# Patient Record
Sex: FEMALE | Race: WHITE | Employment: FULL TIME | URBAN - METROPOLITAN AREA
[De-identification: names, ages, dates, MRNs, and addresses within clinical notes are randomized per-mention and may not be internally consistent; named-entity substitution may affect disease eponyms.]

---

## 2020-03-20 LAB
CHLAMYDIA, EXTERNAL: NEGATIVE
N. GONORRHEA, EXTERNAL: NEGATIVE

## 2020-04-08 LAB
ANTIBODY SCREEN, EXTERNAL: NEGATIVE
HBSAG, EXTERNAL: NEGATIVE
HIV, EXTERNAL: NEGATIVE
RPR, EXTERNAL: NORMAL
RUBELLA, EXTERNAL: NORMAL

## 2020-09-25 LAB — GRBS, EXTERNAL: NEGATIVE

## 2020-10-07 ENCOUNTER — HOSPITAL ENCOUNTER (OUTPATIENT)
Dept: LAB | Age: 35
Discharge: HOME | End: 2020-10-07
Payer: COMMERCIAL

## 2020-10-07 ENCOUNTER — TRANSCRIBE ORDER (OUTPATIENT)
Dept: REGISTRATION | Age: 35
End: 2020-10-07

## 2020-10-07 DIAGNOSIS — Z01.812 PRE-PROCEDURAL LABORATORY EXAMINATIONS: Primary | ICD-10-CM

## 2020-10-07 DIAGNOSIS — Z01.812 PRE-PROCEDURAL LABORATORY EXAMINATIONS: ICD-10-CM

## 2020-10-07 PROCEDURE — 87635 SARS-COV-2 COVID-19 AMP PRB: CPT

## 2020-10-08 LAB — SARS-COV-2, COV2NT: NOT DETECTED

## 2020-10-13 ENCOUNTER — ANESTHESIA EVENT (OUTPATIENT)
Dept: LABOR AND DELIVERY | Age: 35
End: 2020-10-13
Payer: COMMERCIAL

## 2020-10-13 ENCOUNTER — HOSPITAL ENCOUNTER (INPATIENT)
Age: 35
LOS: 2 days | Discharge: HOME | End: 2020-10-15
Attending: OBSTETRICS & GYNECOLOGY | Admitting: OBSTETRICS & GYNECOLOGY
Payer: COMMERCIAL

## 2020-10-13 ENCOUNTER — ANESTHESIA (OUTPATIENT)
Dept: LABOR AND DELIVERY | Age: 35
End: 2020-10-13
Payer: COMMERCIAL

## 2020-10-13 LAB
DAILY QC (YES/NO)?: YES
ERYTHROCYTE [DISTWIDTH] IN BLOOD BY AUTOMATED COUNT: 13 % (ref 11.5–14.5)
HCT VFR BLD AUTO: 38.4 % (ref 35–47)
HGB BLD-MCNC: 13.2 G/DL (ref 11.5–16)
MCH RBC QN AUTO: 30.9 PG (ref 26–34)
MCHC RBC AUTO-ENTMCNC: 34.4 G/DL (ref 30–36.5)
MCV RBC AUTO: 89.9 FL (ref 80–99)
NRBC # BLD: 0 K/UL (ref 0–0.01)
NRBC BLD-RTO: 0 PER 100 WBC
PH, VAGINAL FLUID: 6 (ref 5–6.1)
PLATELET # BLD AUTO: 163 K/UL (ref 150–400)
PMV BLD AUTO: 11 FL (ref 8.9–12.9)
RBC # BLD AUTO: 4.27 M/UL (ref 3.8–5.2)
WBC # BLD AUTO: 11.9 K/UL (ref 3.6–11)

## 2020-10-13 PROCEDURE — 75810000275 HC EMERGENCY DEPT VISIT NO LEVEL OF CARE

## 2020-10-13 PROCEDURE — 76060000078 HC EPIDURAL ANESTHESIA: Performed by: ANESTHESIOLOGY

## 2020-10-13 PROCEDURE — 75410000002 HC LABOR FEE PER 1 HR: Performed by: OBSTETRICS & GYNECOLOGY

## 2020-10-13 PROCEDURE — 36415 COLL VENOUS BLD VENIPUNCTURE: CPT

## 2020-10-13 PROCEDURE — 3E0R3BZ INTRODUCTION OF ANESTHETIC AGENT INTO SPINAL CANAL, PERCUTANEOUS APPROACH: ICD-10-PCS | Performed by: ANESTHESIOLOGY

## 2020-10-13 PROCEDURE — 74011000250 HC RX REV CODE- 250: Performed by: ANESTHESIOLOGY

## 2020-10-13 PROCEDURE — 2709999900 HC NON-CHARGEABLE SUPPLY

## 2020-10-13 PROCEDURE — 74011250636 HC RX REV CODE- 250/636: Performed by: ANESTHESIOLOGY

## 2020-10-13 PROCEDURE — 85027 COMPLETE CBC AUTOMATED: CPT

## 2020-10-13 PROCEDURE — 74011250636 HC RX REV CODE- 250/636: Performed by: OBSTETRICS & GYNECOLOGY

## 2020-10-13 PROCEDURE — 74011250637 HC RX REV CODE- 250/637: Performed by: OBSTETRICS & GYNECOLOGY

## 2020-10-13 PROCEDURE — 00HU33Z INSERTION OF INFUSION DEVICE INTO SPINAL CANAL, PERCUTANEOUS APPROACH: ICD-10-PCS | Performed by: ANESTHESIOLOGY

## 2020-10-13 PROCEDURE — 99283 EMERGENCY DEPT VISIT LOW MDM: CPT

## 2020-10-13 PROCEDURE — 59025 FETAL NON-STRESS TEST: CPT

## 2020-10-13 PROCEDURE — 77030005513 HC CATH URETH FOL11 MDII -B

## 2020-10-13 PROCEDURE — 77030014125 HC TY EPDRL BBMI -B: Performed by: ANESTHESIOLOGY

## 2020-10-13 PROCEDURE — 83986 ASSAY PH BODY FLUID NOS: CPT | Performed by: OBSTETRICS & GYNECOLOGY

## 2020-10-13 PROCEDURE — 74011250637 HC RX REV CODE- 250/637

## 2020-10-13 PROCEDURE — 75410000003 HC RECOV DEL/VAG/CSECN EA 0.5 HR: Performed by: OBSTETRICS & GYNECOLOGY

## 2020-10-13 PROCEDURE — 65270000029 HC RM PRIVATE

## 2020-10-13 PROCEDURE — 75410000000 HC DELIVERY VAGINAL/SINGLE: Performed by: OBSTETRICS & GYNECOLOGY

## 2020-10-13 RX ORDER — METHYLERGONOVINE MALEATE 0.2 MG/ML
0.2 INJECTION INTRAVENOUS ONCE
Status: COMPLETED | OUTPATIENT
Start: 2020-10-13 | End: 2020-10-13

## 2020-10-13 RX ORDER — ZOLPIDEM TARTRATE 5 MG/1
5 TABLET ORAL
Status: DISCONTINUED | OUTPATIENT
Start: 2020-10-13 | End: 2020-10-15 | Stop reason: HOSPADM

## 2020-10-13 RX ORDER — OXYTOCIN/RINGER'S LACTATE 30/500 ML
999 PLASTIC BAG, INJECTION (ML) INTRAVENOUS ONCE
Status: COMPLETED | OUTPATIENT
Start: 2020-10-13 | End: 2020-10-13

## 2020-10-13 RX ORDER — NALOXONE HYDROCHLORIDE 0.4 MG/ML
0.4 INJECTION, SOLUTION INTRAMUSCULAR; INTRAVENOUS; SUBCUTANEOUS AS NEEDED
Status: DISCONTINUED | OUTPATIENT
Start: 2020-10-13 | End: 2020-10-15 | Stop reason: HOSPADM

## 2020-10-13 RX ORDER — OXYTOCIN/RINGER'S LACTATE 30/500 ML
10 PLASTIC BAG, INJECTION (ML) INTRAVENOUS AS NEEDED
Status: DISCONTINUED | OUTPATIENT
Start: 2020-10-13 | End: 2020-10-15 | Stop reason: HOSPADM

## 2020-10-13 RX ORDER — SODIUM CHLORIDE 0.9 % (FLUSH) 0.9 %
5-40 SYRINGE (ML) INJECTION EVERY 8 HOURS
Status: DISCONTINUED | OUTPATIENT
Start: 2020-10-13 | End: 2020-10-14

## 2020-10-13 RX ORDER — IBUPROFEN 800 MG/1
800 TABLET ORAL
Status: DISCONTINUED | OUTPATIENT
Start: 2020-10-13 | End: 2020-10-15 | Stop reason: HOSPADM

## 2020-10-13 RX ORDER — SODIUM CHLORIDE, SODIUM LACTATE, POTASSIUM CHLORIDE, CALCIUM CHLORIDE 600; 310; 30; 20 MG/100ML; MG/100ML; MG/100ML; MG/100ML
125 INJECTION, SOLUTION INTRAVENOUS CONTINUOUS
Status: DISCONTINUED | OUTPATIENT
Start: 2020-10-13 | End: 2020-10-15

## 2020-10-13 RX ORDER — CALCIUM CARBONATE 200(500)MG
2 TABLET,CHEWABLE ORAL DAILY
COMMUNITY

## 2020-10-13 RX ORDER — ONDANSETRON 2 MG/ML
4 INJECTION INTRAMUSCULAR; INTRAVENOUS
Status: DISCONTINUED | OUTPATIENT
Start: 2020-10-13 | End: 2020-10-15 | Stop reason: HOSPADM

## 2020-10-13 RX ORDER — OXYTOCIN/RINGER'S LACTATE 30/500 ML
750 PLASTIC BAG, INJECTION (ML) INTRAVENOUS ONCE
Status: COMPLETED | OUTPATIENT
Start: 2020-10-13 | End: 2020-10-13

## 2020-10-13 RX ORDER — SODIUM CHLORIDE 0.9 % (FLUSH) 0.9 %
5-40 SYRINGE (ML) INJECTION AS NEEDED
Status: DISCONTINUED | OUTPATIENT
Start: 2020-10-13 | End: 2020-10-15

## 2020-10-13 RX ORDER — HYDROCODONE BITARTRATE AND ACETAMINOPHEN 5; 325 MG/1; MG/1
1 TABLET ORAL
Status: DISCONTINUED | OUTPATIENT
Start: 2020-10-13 | End: 2020-10-15 | Stop reason: HOSPADM

## 2020-10-13 RX ORDER — EPHEDRINE SULFATE/0.9% NACL/PF 50 MG/5 ML
10 SYRINGE (ML) INTRAVENOUS
Status: DISCONTINUED | OUTPATIENT
Start: 2020-10-13 | End: 2020-10-15 | Stop reason: HOSPADM

## 2020-10-13 RX ORDER — OXYTOCIN/RINGER'S LACTATE 30/500 ML
95 PLASTIC BAG, INJECTION (ML) INTRAVENOUS AS NEEDED
Status: COMPLETED | OUTPATIENT
Start: 2020-10-13 | End: 2020-10-13

## 2020-10-13 RX ORDER — OXYTOCIN/RINGER'S LACTATE 30/500 ML
95 PLASTIC BAG, INJECTION (ML) INTRAVENOUS AS NEEDED
Status: DISCONTINUED | OUTPATIENT
Start: 2020-10-13 | End: 2020-10-15 | Stop reason: HOSPADM

## 2020-10-13 RX ORDER — MINERAL OIL
OIL (ML) ORAL ONCE
Status: COMPLETED | OUTPATIENT
Start: 2020-10-13 | End: 2020-10-13

## 2020-10-13 RX ORDER — FAMOTIDINE 20 MG/1
20 TABLET, FILM COATED ORAL 2 TIMES DAILY
COMMUNITY

## 2020-10-13 RX ORDER — DIPHENHYDRAMINE HCL 25 MG
25 CAPSULE ORAL
Status: DISCONTINUED | OUTPATIENT
Start: 2020-10-13 | End: 2020-10-15 | Stop reason: HOSPADM

## 2020-10-13 RX ORDER — FENTANYL/BUPIVACAINE/NS/PF 2-1250MCG
1-16 PREFILLED PUMP RESERVOIR EPIDURAL CONTINUOUS
Status: DISCONTINUED | OUTPATIENT
Start: 2020-10-13 | End: 2020-10-15

## 2020-10-13 RX ORDER — ACETAMINOPHEN 325 MG/1
650 TABLET ORAL
Status: DISCONTINUED | OUTPATIENT
Start: 2020-10-13 | End: 2020-10-15 | Stop reason: HOSPADM

## 2020-10-13 RX ORDER — NALOXONE HYDROCHLORIDE 0.4 MG/ML
0.4 INJECTION, SOLUTION INTRAMUSCULAR; INTRAVENOUS; SUBCUTANEOUS AS NEEDED
Status: DISCONTINUED | OUTPATIENT
Start: 2020-10-13 | End: 2020-10-13 | Stop reason: SDUPTHER

## 2020-10-13 RX ORDER — HYDROCORTISONE ACETATE PRAMOXINE HCL 2.5; 1 G/100G; G/100G
CREAM TOPICAL AS NEEDED
Status: DISCONTINUED | OUTPATIENT
Start: 2020-10-13 | End: 2020-10-15 | Stop reason: HOSPADM

## 2020-10-13 RX ORDER — HYDROCODONE BITARTRATE AND ACETAMINOPHEN 5; 325 MG/1; MG/1
2 TABLET ORAL
Status: DISCONTINUED | OUTPATIENT
Start: 2020-10-13 | End: 2020-10-15 | Stop reason: HOSPADM

## 2020-10-13 RX ORDER — LIDOCAINE HYDROCHLORIDE 10 MG/ML
10 INJECTION INFILTRATION; PERINEURAL AS NEEDED
Status: DISCONTINUED | OUTPATIENT
Start: 2020-10-13 | End: 2020-10-15 | Stop reason: HOSPADM

## 2020-10-13 RX ORDER — BUPIVACAINE HYDROCHLORIDE 2.5 MG/ML
INJECTION, SOLUTION EPIDURAL; INFILTRATION; INTRACAUDAL AS NEEDED
Status: DISCONTINUED | OUTPATIENT
Start: 2020-10-13 | End: 2020-10-13 | Stop reason: HOSPADM

## 2020-10-13 RX ORDER — ONDANSETRON 4 MG/1
4 TABLET, ORALLY DISINTEGRATING ORAL
Status: ACTIVE | OUTPATIENT
Start: 2020-10-13 | End: 2020-10-14

## 2020-10-13 RX ORDER — MINERAL OIL
OIL (ML) ORAL
Status: COMPLETED
Start: 2020-10-13 | End: 2020-10-13

## 2020-10-13 RX ADMIN — SODIUM CHLORIDE, SODIUM LACTATE, POTASSIUM CHLORIDE, AND CALCIUM CHLORIDE 1000 ML: 600; 310; 30; 20 INJECTION, SOLUTION INTRAVENOUS at 05:20

## 2020-10-13 RX ADMIN — OXYTOCIN 45000 MILLI-UNITS/HR: 10 INJECTION INTRAVENOUS at 08:42

## 2020-10-13 RX ADMIN — Medication 10 ML/HR: at 06:12

## 2020-10-13 RX ADMIN — OXYTOCIN 95 MILLI-UNITS/MIN: 10 INJECTION INTRAVENOUS at 08:54

## 2020-10-13 RX ADMIN — Medication 30 ML: at 08:29

## 2020-10-13 RX ADMIN — OXYTOCIN 59940 MILLI-UNITS/HR: 10 INJECTION INTRAVENOUS at 08:32

## 2020-10-13 RX ADMIN — IBUPROFEN 800 MG: 800 TABLET ORAL at 18:50

## 2020-10-13 RX ADMIN — METHYLERGONOVINE MALEATE 0.2 MG: 0.2 INJECTION, SOLUTION INTRAMUSCULAR; INTRAVENOUS at 10:26

## 2020-10-13 RX ADMIN — MINERAL OIL 30 ML: 1000 SOLUTION ORAL at 08:29

## 2020-10-13 RX ADMIN — OXYTOCIN 150 MILLI-UNITS/MIN: 10 INJECTION INTRAVENOUS at 09:39

## 2020-10-13 RX ADMIN — SODIUM CHLORIDE, SODIUM LACTATE, POTASSIUM CHLORIDE, AND CALCIUM CHLORIDE 999 ML/HR: 600; 310; 30; 20 INJECTION, SOLUTION INTRAVENOUS at 05:13

## 2020-10-13 RX ADMIN — SODIUM CHLORIDE, SODIUM LACTATE, POTASSIUM CHLORIDE, AND CALCIUM CHLORIDE 1000 ML: 600; 310; 30; 20 INJECTION, SOLUTION INTRAVENOUS at 06:20

## 2020-10-13 RX ADMIN — BUPIVACAINE HYDROCHLORIDE 10 ML: 2.5 INJECTION, SOLUTION EPIDURAL; INFILTRATION; INTRACAUDAL; PERINEURAL at 05:55

## 2020-10-13 RX ADMIN — SODIUM CHLORIDE, SODIUM LACTATE, POTASSIUM CHLORIDE, AND CALCIUM CHLORIDE 125 ML/HR: 600; 310; 30; 20 INJECTION, SOLUTION INTRAVENOUS at 06:59

## 2020-10-13 RX ADMIN — IBUPROFEN 800 MG: 800 TABLET ORAL at 11:17

## 2020-10-13 NOTE — ANESTHESIA PROCEDURE NOTES
Epidural Block    Start time: 10/13/2020 5:47 AM  End time: 10/13/2020 5:55 AM  Performed by: Yvonne Quinn MD  Authorized by: Yvonne Quinn MD     Pre-Procedure  Indication: labor epidural    Preanesthetic Checklist: patient identified, risks and benefits discussed, anesthesia consent, timeout performed and anesthesia consent    Timeout Time: 05:47        Epidural:   Patient position:  Seated  Prep region:  Lumbar  Prep: Betadine    Location:  L3-4    Needle and Epidural Catheter:   Needle Type:  Tuohy  Needle Gauge:  17 G  Injection Technique:  Loss of resistance using air  Attempts:  1  Catheter Size:  18 G  Catheter at Skin Depth (cm):  8  Depth in Epidural Space (cm):  4  Events: no paresthesia and negative aspiration test    Test Dose:  Lidocaine 1.5% w/ epi and negative    Assessment:   Catheter Secured:  Tegaderm and tape  Insertion:  Uncomplicated  Patient tolerance:  Patient tolerated the procedure well with no immediate complications

## 2020-10-13 NOTE — ROUTINE PROCESS
SBAR IN Report: Mother Verbal report received from Teresa Tovar RN (full name & credentials) on this patient, who is now being transferred from L&D (unit) for routine progression of care. The patient is not wearing a green \"Anesthesia-Duramorph\" band. Report consisted of patient's Situation, Background, Assessment and Recommendations (SBAR). [place] ID bands were compared with the identification form, and verified with the patient and transferring nurse. Information from the SBAR, Kardex, Procedure Summary, Intake/Output and Recent Results and the [place] Report was reviewed with the transferring nurse; opportunity for questions and clarification provided.

## 2020-10-13 NOTE — PROGRESS NOTES
4:50 AM  Patient admitted to labor and delivery with a complaint of contractions. 5:03 AM  The patient stated she thought her water had broken. Nitrazine performed and was equivocal.  5:08 AM  Cervical exam found to be 6cm dilated. 5:37 AM  Spoke to Dr. Paresh Pearson and informed her of the patients arrival and complaint.  Per Dr. Paresh Pearson, call her and notify her once the patient has an epidural.

## 2020-10-13 NOTE — ANESTHESIA PREPROCEDURE EVALUATION
Anesthetic History   No history of anesthetic complications            Review of Systems / Medical History  Patient summary reviewed, nursing notes reviewed and pertinent labs reviewed    Pulmonary  Within defined limits                 Neuro/Psych   Within defined limits           Cardiovascular  Within defined limits                     GI/Hepatic/Renal               Comments: cholestasis Endo/Other        Obesity     Other Findings              Physical Exam    Airway  Mallampati: II  TM Distance: 4 - 6 cm  Neck ROM: normal range of motion        Cardiovascular               Dental  No notable dental hx       Pulmonary                 Abdominal         Other Findings            Anesthetic Plan    ASA: 2  Anesthesia type: epidural            Anesthetic plan and risks discussed with: Patient          note entered after procedure

## 2020-10-13 NOTE — DISCHARGE SUMMARY
Obstetrical Discharge Summary     Name: Alonzo Vaughn MRN: [id number]  SSN: [id number]    YOB: 1985  Age: 30 y.o. Sex: female      Allergies: Patient has no known allergies. Admit Date: 10/13/2020    Discharge Date: 10/15/2020    Admitting Physician: Boyd Paz MD     Attending Physician:  Nelson Toledo MD     * Admission Diagnoses: Labor without complication [W16]    * Discharge Diagnoses:   Information for the patient's :  David Cherry Log Male Maggie Adkins [[id number]]   Delivery of a   male infant via Vaginal, Spontaneous on 10/13/2020 at 8:31 AM  by Antony Rodrigues. Apgars were 9  and 9 . Additional Diagnoses:   Hospital Problems as of 10/13/2020 Never Reviewed          Codes Class Noted - Resolved POA    Labor without complication LWV-[id number]: T41  ICD-9-CM: 316  10/13/2020 - Present Unknown             Lab Results   Component Value Date/Time    Rubella, External Immune 2020    GrBStrep, External Negative 2020    ABO,Rh O Positive 2016      Immunization History   Administered Date(s) Administered    Tdap 2016       * Procedures: Term  by Dr Carlee Benitez  * No surgery found *           * Discharge Condition: good    * Hospital Course: Normal hospital course following the delivery. * Disposition: Home    Discharge Medications:   Current Discharge Medication List          * Follow-up Care/Patient Instructions:   Activity: No sex for 6 weeks and No heavy lifting for 6 weeks  Diet: Regular Diet  Wound Care: None needed    RTO in 6 weeks for pp check    Follow-up Information    None

## 2020-10-13 NOTE — PROGRESS NOTES
4739: Dr. Carrie Blancas at bedside for epidural placement    3818: Time out    0615: SVE by RN, 8/90/-1.    8638: Bedside and Verbal shift change report given to [address] [place] [address] (oncoming nurse) by Jamal Laird RN (offgoing nurse). Report included the following information SBAR, Kardex, Intake/Output, MAR and Recent Results. 8184: Dr. Valente Pyle at bedside. 8190: SVE by MD anterior lip.

## 2020-10-13 NOTE — PROGRESS NOTES
3563: Verbal shift change report given to Audrey Calzada RN (oncoming nurse) by David Pepe RN (offgoing nurse). Report included the following information SBAR, MAR and Recent Results. 5769: This RN at bedside to perform shift assessment. Patient resting in position of comfort in locked and lowered bed. FOB sitting at bedside. Patient reports feeling intermittent pressure. 7063: SVE performed by this RN: 10/100/+1. Patient tolerated well. 6049: Elpidio TRUONG at bedside. VORB to begin pushing, MD to return to bedside. 2543Pfrancis Forbes RN at bedside with this RN.     0800: Patient beginning to push with this RN.      9412: Elpidio TRUONG at bedside assessing patient's pushing. 0831:  of viable, infant boy. Infant immediately placed on maternal abdomen, dried and stimulated. 1038Josiephine Shouts from Elpidio TRUONG to begin PPP titration at 999ml/hr. See MAR.    0840: Gloria care completed by Kris Ho RN. This RN assisting patient with bed pad, chux, and ice pack application. Patient able to lift bilateral hips to assist this RN. Patient repositioned in bed by this RN and spouse. 9948: PPP titration decreased to 750ml/hr. See MAR.    5664: PPP titration decreased to 95ml/hr, per order. 0915: Patient provided with menu. Spouse ordering breakfast for patient. 0935: Bed pad, chux, and ice pack changed. Two hour QBL thus far: 39mL. 1005: Fundus continuously requiring additional massage. Bed pad and chux changed. This RN to inform Elpidio TRUONG.    Additional two hour QBL thus far: 16mL  Total QBL thus far: 355 mL    1007: This RN informing Elpidio TRUONG via telephone of required fundal massage during most fundals, total two hour QBL thus far of 55mL, and patient's baseline BP. This RN asking about possibility of methergine administration. TORB from Elpidio TRUONG to administer 0.2mg Methergine via IM injection. 1033: PPP titration complete. 1115: Gripper socks applied.  Patient ambulatory to restroom with steady gait, accompanied by this RN and Jono Student RN. 1100 clear, bloody urine output. Patient demonstrating understanding of appropriate kristen care using kristen care bottle. Patient assisted into disposable underwear with blue chux and kristen pad. Patient performing hand hygiene. 1145: TRANSFER - OUT REPORT:    Verbal report given to Tressia Claude RN(name) on Ruthie Imus  being transferred to MIU (unit) for routine progression of care       Report consisted of patients Situation, Background, Assessment and   Recommendations(SBAR). Information from the following report(s) SBAR, Intake/Output, MAR and Recent Results was reviewed with the receiving nurse. Lines:   Peripheral IV 10/13/20 Left;Posterior Hand (Active)   Site Assessment Clean, dry, & intact 10/13/20 0900   Phlebitis Assessment 0 10/13/20 0900   Infiltration Assessment 0 10/13/20 0900   Dressing Status Clean, dry, & intact 10/13/20 0900   Dressing Type Transparent 10/13/20 0900   Hub Color/Line Status Pink; Infusing 10/13/20 0900        Opportunity for questions and clarification was provided. Patient transported with:   Registered Nurse Norma Silveira RN. No sign of distress noted at time of care transfer. Patient ambulatory with steady gait from wheelchair to MIU bed.

## 2020-10-13 NOTE — PROCEDURES
Delivery Note    Obstetrician:  Casi Tello MD    Assistant: none    Pre-Delivery Diagnosis: Term pregnancy, Spontaneous labor and Single fetus    Post-Delivery Diagnosis: Living  infant(s) and Male    Intrapartum Event: None    Procedure: Spontaneous vaginal delivery    Epidural: YES    Monitor:  Fetal Heart Tones - External and Uterine Contractions - External    Indications for instrumental delivery: none    Estimated Blood Loss: 300    Episiotomy: none    Laceration(s):  none    Laceration(s) repair: NO    Presentation: Cephalic    Fetal Description: russ    Fetal Position: Occiput Anterior    Birth Weight: pending    Birth Length: pending    Apgar - One Minute: 9    Apgar - Five Minutes: 9    Umbilical Cord: 3 vessels present  Specimens: none  Complications:  none           Cord Blood Results:   Information for the patient's :  Paramjit Maltese, Male Tamar Duty [[id number]]   No results found for: PCTABR, PCTDIG, BILI, ABORH     Prenatal Labs:     Lab Results   Component Value Date/Time    HBsAg, External Negative 2020    HIV, External Negative 2020    Rubella, External Immune 2020    RPR, External Non-Reactive 2020    Gonorrhea, External Negative 2020    Chlamydia, External Negative  2020    GrBStrep, External Negative 2020        Attending Attestation: I was present and scrubbed for the entire procedure

## 2020-10-13 NOTE — H&P
Labor and Delivery Admission Note  10/13/2020    30 y.o., , female, G2 P 1001 Estimated Date of Delivery: 10/20/20 by dates and US presents with contractions at 0448 Pt states that contractions started at home around 0130 and progressively became more severe. She came to L and D for evaluation and Arsalan Yin shortly after admission for clear fluid. She denies VB, and has good FM. No complications with the pregnancy. She denies Fever, N/V/D      PNC: Blood type: O            RH: pos            GBS status: negative    POB:  G1-  at 36 weeks IOL for Cholestasis of pregnancy  G2- current, no complications    Pgyn:  Denies    Past Medical History:   Diagnosis Date    Febrile seizures ([facility] [address]     As a child    Psychiatric problem     anxiety     History reviewed. No pertinent surgical history.   OB/GYN: [facility]    Meds:   Current Facility-Administered Medications   Medication Dose Route Frequency    mineral oil        lactated Ringers infusion  125 mL/hr IntraVENous CONTINUOUS    lactated ringers bolus infusion 500 mL  500 mL IntraVENous PRN    sodium chloride (NS) flush 5-40 mL  5-40 mL IntraVENous Q8H    sodium chloride (NS) flush 5-40 mL  5-40 mL IntraVENous PRN    oxytocin (PITOCIN) 10 unit bolus from bag  10 Units IntraVENous PRN    And    oxytocin (PITOCIN) 30 units/500 ml LR  95 davi-units/min IntraVENous PRN    ondansetron (ZOFRAN) injection 4 mg  4 mg IntraVENous Q4H PRN    naloxone (NARCAN) injection 0.4 mg  0.4 mg IntraVENous PRN    lidocaine (XYLOCAINE) 10 mg/mL (1 %) injection 10 mL  10 mL IntraDERMal PRN    lactated ringers bolus infusion 500 mL  500 mL IntraVENous ONCE    fentaNYL 2mcg/mL - bupivacaine 0.125% pf epidural  1-16 mL/hr Epidural CONTINUOUS    ePHEDrine in NS (PF) (MISTOLE) 10 mg/mL in NS syringe 10 mg  10 mg IntraVENous Q10MIN PRN    mineral oil   Topical ONCE     Facility-Administered Medications Ordered in Other Encounters   Medication Dose Route Frequency    bupivacaine (PF) (MARCAINE) 0.25 % (2.5 mg/mL) injection   Epidural PRN     Allergies: No Known Allergies  Pertinent ROS: see HPI, otherwise noncontributory     History reviewed. No pertinent family history.   Social History     Socioeconomic History    Marital status:      Spouse name: Not on file    Number of children: Not on file    Years of education: Not on file    Highest education level: Not on file   Occupational History    Not on file   Social Needs    Financial resource strain: Not on file    Food insecurity     Worry: Not on file     Inability: Not on file    Transportation needs     Medical: Not on file     Non-medical: Not on file   Tobacco Use    Smoking status: Never Smoker    Smokeless tobacco: Never Used   Substance and Sexual Activity    Alcohol use: Not Currently    Drug use: No    Sexual activity: Never     Partners: Male     Birth control/protection: None   Lifestyle    Physical activity     Days per week: Not on file     Minutes per session: Not on file    Stress: Not on file   Relationships    Social connections     Talks on phone: Not on file     Gets together: Not on file     Attends Orthodox service: Not on file     Active member of club or organization: Not on file     Attends meetings of clubs or organizations: Not on file     Relationship status: Not on file    Intimate partner violence     Fear of current or ex partner: Not on file     Emotionally abused: Not on file     Physically abused: Not on file     Forced sexual activity: Not on file   Other Topics Concern     Service Not Asked    Blood Transfusions Not Asked    Caffeine Concern Not Asked    Occupational Exposure Not Asked   [place] Hazards Not Asked    Sleep Concern Not Asked    Stress Concern Not Asked    Weight Concern Not Asked    Special Diet Not Asked    Back Care Not Asked    Exercise Not Asked    Bike Helmet Not Asked   [address] Not Asked    Self-Exams Not Asked   Social History Narrative    Not on file       OBJECTIVE:  Gravid , female NAD  Temp (24hrs), Av.1 °F (36.7 °C), Min:97.8 °F (36.6 °C), Max:98.4 °F (36.9 °C)    Visit Vitals  /61   Pulse 78   Temp 97.8 °F (36.6 °C)   Ht 5' 8\" (1.727 m)   Wt 97.1 kg (214 lb)   SpO2 100%   Breastfeeding No   BMI 32.54 kg/m²       Labs:    Lab Results   Component Value Date/Time    WBC 11.9 (H) 10/13/2020 05:16 AM       Exam:  HEENT:  normal   Lungs:  clear  Cor:  RRR  Abdomen:  Fundal height c/w GA                    Soft between UC                    Clinical EFW 7 1/2  Fetal heart rate tracin reactive, no decels, + accels, moderate variability, Cat I, reactive  Contraction pattern: Q2-3  Cervix:  8/90/-2 by RN exam  Fluid:  SROM clear fluid  Pelvimetry:  AP-good                      Arch- adequate                      Sidewalls- adequate                      Pelvis feels adequate for fetus.     Impression:  IUP at 39 0/7 weeks ruptured in active labor, GBS +    Plan: Anticipate            Epidural as desired     Ursula Venegas MD

## 2020-10-14 PROCEDURE — 77030021125

## 2020-10-14 PROCEDURE — 65270000029 HC RM PRIVATE

## 2020-10-14 PROCEDURE — 74011250637 HC RX REV CODE- 250/637: Performed by: OBSTETRICS & GYNECOLOGY

## 2020-10-14 RX ORDER — SENNOSIDES 8.6 MG/1
1 TABLET ORAL DAILY
Status: DISCONTINUED | OUTPATIENT
Start: 2020-10-14 | End: 2020-10-15 | Stop reason: HOSPADM

## 2020-10-14 RX ADMIN — IBUPROFEN 800 MG: 800 TABLET ORAL at 23:27

## 2020-10-14 RX ADMIN — IBUPROFEN 800 MG: 800 TABLET ORAL at 06:39

## 2020-10-14 RX ADMIN — IBUPROFEN 800 MG: 800 TABLET ORAL at 15:17

## 2020-10-14 RX ADMIN — SENNA 8.6 MG: 8.6 TABLET, COATED ORAL at 22:10

## 2020-10-14 RX ADMIN — SENNA 8.6 MG: 8.6 TABLET, COATED ORAL at 06:39

## 2020-10-14 NOTE — ROUTINE PROCESS
Bedside and Verbal shift change report given to [facility] Cheryl (oncoming nurse) by Polina Montgomery. Kari Doherty (offgoing nurse). Report given with SBAR, Kardex, Intake/Output and MAR.

## 2020-10-14 NOTE — PROGRESS NOTES
Post-Partum Day Number 1 Progress Note    Melissa Quiroz       Information for the patient's :  Clau Wood, Male Winda Route [[id number]]   Vaginal, Spontaneous    Patient doing well without significant complaint. Voiding without difficulty, normal lochia. Vitals:  Visit Vitals  /60 (BP 1 Location: Left arm, BP Patient Position: At rest)   Pulse 79   Temp 98.2 °F (36.8 °C)   Resp 16   Ht 5' 8\" (1.727 m)   Wt 97.1 kg (214 lb)   SpO2 97%   Breastfeeding Unknown   BMI 32.54 kg/m²     Temp (24hrs), Av °F (36.7 °C), Min:97.7 °F (36.5 °C), Max:98.2 °F (36.8 °C)        Exam:   Patient without distress. FF @ U-2 NT                LE NT w/o edema    Labs:     Lab Results   Component Value Date/Time    WBC 11.9 (H) 10/13/2020 05:16 AM    WBC 10.6 2016 06:34 AM    HGB 13.2 10/13/2020 05:16 AM    HGB 11.4 (L) 2016 06:34 AM    HCT 38.4 10/13/2020 05:16 AM    HCT 34.9 (L) 2016 06:34 AM    PLATELET 560  05:16 AM    PLATELET 356  06:34 AM    Hgb, External 14.0 2016    Hct, External 41.9 2016       No results found for this or any previous visit (from the past 24 hour(s)). Assessment: PPD 1 s/p , stable    Plan:  1. Continue routine postpartum care  2. VMI, pt and  unsure if wanting circ; questions answered but they are still undecided. To alert nursing staff if wanting to have done prior to d/c.   3.  Discharge home tomorrow.     Brian White DO, [address] [facility],[address] [facility]

## 2020-10-15 VITALS
WEIGHT: 214 LBS | HEART RATE: 60 BPM | HEIGHT: 68 IN | TEMPERATURE: 97.5 F | RESPIRATION RATE: 17 BRPM | OXYGEN SATURATION: 97 % | SYSTOLIC BLOOD PRESSURE: 102 MMHG | DIASTOLIC BLOOD PRESSURE: 62 MMHG | BODY MASS INDEX: 32.43 KG/M2

## 2020-10-15 PROCEDURE — 77030021125

## 2020-10-15 PROCEDURE — 74011250637 HC RX REV CODE- 250/637: Performed by: OBSTETRICS & GYNECOLOGY

## 2020-10-15 RX ADMIN — IBUPROFEN 800 MG: 800 TABLET ORAL at 07:24

## 2020-10-15 NOTE — DISCHARGE INSTRUCTIONS
Discharge Instructions for Vaginal Delivery    Patient ID:  Cheo Patterson  [id number]  35 y.o.  1985    Take Home Medications       Continue taking your prenatal vitamins if you are breastfeeding. Follow-up care is a key part of your treatment and safety. Please schedule and keep appointments. Follow-up with your primary OB in 6 weeks. Activity  Avoid anything in your vagina for 6 weeks (no intercourse, tampons, or douching). You may drive unless you are taking prescription pain medications. Climbing stairs and light lifting are okay. Please avoid excessive exercise, though walking is okay- you'll be tired! Diet  Regular diet as tolerated. Be sure to drink plenty of fluids if you are breastfeeding. Wound care  If you have stitches, continue to rinse with a squirt bottle of warm water each time you void for about 7-10 days. .  Your stitches will gradually dissolve over four to eight weeks. Sitz baths are also helpful to keep the wound clean, encourage healing, and to help with pain associated with the stitches or hemorrhoids. You can use either a sitz bath basin or a bathtub filled with 2-3\" inches of plain warm water. Soak for 10 minutes 3 times a day as tolerated. Pain Management  1. Over the counter medications such as Tylenol and ibuprofen (Motrin or Advil) are ideal.  These may be taken together, alternating doses. You may  take the maximum dose:  Motrin or Advil (generic ibuprofen), either 3 tablets every 6 hours or 4 tablets every 8 hours or Tylenol (acetominophen) 1000mg every 6 hours (equivalent to 2 extra strength Tylenol). 2. You may also have a precrescription for stronger pain medication. Take only as needed and transition to over the counter medication in the next few days. Minimize amounts of the prescription medication, as it can be habit-forming and will worsen or cause constipation.  Most patients will find that within a couple of days, their pain is adequately controlled using only over-the-counter medications. 3. The prescription pain medication is mixed with Tylenol, therefore, you should not take any extra Tylenol or acetaminophen until you have reduced your prescription pain medication. 4. Add heating pad or sitz baths as needed. Add hemorrhoid wipes or ointments if needed    Constipation  1. Constipation is normal after pregnancy and delivery, especially while taking prescription narcotic pain medication. 2. Over the counter remedies including ducosate (Colace), take 1-2 capsules 1-2 times daily for soft stool as needed. You may also add/ try milk of magnesia or rectal remedies such as Dulcolax or Fleets enema. Recovery: What to Expect at Home  1. Fatigue is expected. Try to rest when you can and don't worry about doing housework or other tasks which can wait. 2. The soreness along your bottom will improve significantly over the first 2 weeks, but it may take 6 weeks before you are completely recovered. 3. Back pain or general body aches or muscle soreness are expected and should improve with acetominophen or ibuprofen. 4. Leg swelling due to pregnancy and/or IV fluids given in the hospital will take about two weeks to resolve. 5. Most women experience some form of the \"Baby Blues\" after having a baby. Feeling emotional, tearful, frustrated, anxious, sad, and irritable some of the time is normal and go away after about 2 weeks. Adequate rest and help from your family will help. Take breaks from caring for the baby. Call your doctor if your symptoms seem severe, last more than 2 weeks, or seem to be getting worse instead of better. Get help immediately if you have thoughts of wanting to hurt yourself or others! Call your doctor or seek immediate medical care if you have:  Heavy vaginal bleeding, soaking through one or more pads an hour for several hours. Foul-smelling discharge from your vagina or incision.   Consistent nausea and vomiting and cannot keep fluids down. Consistent pain that does not get better after you take pain medicine.   Sudden chest pain and shortness of breath  Signs of a blood clot: pain/ swelling/ increasing redness in your lower extremeties  Signs of infection: increased pain in your abdomen or vaginal area; red streaks, warmth, or tenderness of your breasts; fever of 100.5 F or greater

## 2020-10-15 NOTE — ROUTINE PROCESS
Bedside and Verbal shift change report given to KEIKO Roach RN (oncoming nurse) by Verena Dyer. Lauren Muller RN (offgoing nurse). Report included the following information SBAR, Kardex, Intake/Output, MAR and Recent Results.

## 2020-10-15 NOTE — PROGRESS NOTES
Signed hard copy electronic signature pad not working. Pt off unit in stable condition via wheelchair with volunteers for discharge home per Dr. Antoine Gandhi. Pt is aware to follow-up in 4-6 weeks. Pt denies any HA, dizziness, N/V or pain at this time. Infant in car seat and discharged with mother.

## 2020-10-15 NOTE — PROGRESS NOTES
Post-Partum Day Number 2 Progress Note    Patient doing well post-partum without significant complaints. Voiding without difficulty, normal lochia. Vitals:    Patient Vitals for the past 24 hrs:   BP Temp Pulse Resp   10/14/20 2325 128/76 98.2 °F (36.8 °C) 67 16   10/14/20 1535 119/77 97.9 °F (36.6 °C) 63 16   10/14/20 0824 110/65 97.4 °F (36.3 °C) 69 16     Temp (24hrs), Av.8 °F (36.6 °C), Min:97.4 °F (36.3 °C), Max:98.2 °F (36.8 °C)      Vital signs stable, afebrile. Exam:  Patient without distress. Abdomen soft, fundus firm, nontender               Lower extremities, RANDAL nontender w/o edema    Labs: No results found for this or any previous visit (from the past 24 hour(s)). Assessment and Plan:  PPD 2, stable, routine care   Discharge today-instructions reviewed.   Stable w/ OTC meds